# Patient Record
Sex: MALE | URBAN - METROPOLITAN AREA
[De-identification: names, ages, dates, MRNs, and addresses within clinical notes are randomized per-mention and may not be internally consistent; named-entity substitution may affect disease eponyms.]

---

## 2018-07-11 PROBLEM — H90.3 SENSORINEURAL HEARING LOSS, BILATERAL: Status: ACTIVE | Noted: 2018-07-11

## 2018-07-11 PROBLEM — K63.5 POLYP OF COLON: Status: ACTIVE | Noted: 2018-07-11

## 2018-07-11 PROBLEM — I25.9 CHRONIC ISCHEMIC HEART DISEASE, UNSPECIFIED: Status: ACTIVE | Noted: 2018-07-11

## 2019-07-18 PROBLEM — G47.30 SLEEP APNEA, UNSPECIFIED: Status: ACTIVE | Noted: 2019-07-18

## 2019-07-18 PROBLEM — E78.5 HYPERLIPIDEMIA, UNSPECIFIED: Status: ACTIVE | Noted: 2019-07-18

## 2019-07-18 PROBLEM — R73.01 IMPAIRED FASTING GLUCOSE: Status: ACTIVE | Noted: 2019-07-18

## 2019-07-18 PROBLEM — M05.79 RHEUMATOID ARTHRITIS WITH RHEUMATOID FACTOR OF MULTIPLE SITES WITHOUT ORGAN OR SYSTEMS INVOLVEMENT: Status: ACTIVE | Noted: 2019-07-18

## 2019-07-18 PROBLEM — Z79.899 OTHER LONG TERM (CURRENT) DRUG THERAPY: Status: ACTIVE | Noted: 2019-07-18

## 2019-07-18 PROBLEM — K57.30 DIVERTICULOSIS OF LARGE INTESTINE WITHOUT PERFORATION OR ABSCESS WITHOUT BLEEDING: Status: ACTIVE | Noted: 2019-07-18

## 2019-07-18 PROBLEM — M10.9 GOUT, UNSPECIFIED: Status: ACTIVE | Noted: 2019-07-18

## 2019-07-18 PROBLEM — I10 ESSENTIAL (PRIMARY) HYPERTENSION: Status: ACTIVE | Noted: 2019-07-18

## 2019-07-18 PROBLEM — K27.9 PEPTIC ULCER, SITE UNSPECIFIED, UNSPECIFIED AS ACUTE OR CHRONIC, WITHOUT HEMORRHAGE OR PERFORATION: Status: ACTIVE | Noted: 2019-07-18

## 2019-07-18 PROBLEM — M25.519 PAIN IN UNSPECIFIED SHOULDER: Status: ACTIVE | Noted: 2019-07-18

## 2019-07-18 PROBLEM — T78.40XA ALLERGY, UNSPECIFIED, INITIAL ENCOUNTER: Status: ACTIVE | Noted: 2019-07-18

## 2019-07-18 PROBLEM — E66.3 OVERWEIGHT: Status: ACTIVE | Noted: 2019-07-18

## 2019-07-25 PROBLEM — Z00.00 ENCOUNTER FOR GENERAL ADULT MEDICAL EXAMINATION WITHOUT ABNORMAL FINDINGS: Status: ACTIVE | Noted: 2019-07-25

## 2019-10-15 PROBLEM — N18.30 CHRONIC KIDNEY DISEASE, STAGE 3 UNSPECIFIED: Status: ACTIVE | Noted: 2019-10-15

## 2019-10-15 PROBLEM — Z86.711 PERSONAL HISTORY OF PULMONARY EMBOLISM: Status: ACTIVE | Noted: 2019-10-15

## 2021-06-22 ENCOUNTER — APPOINTMENT (RX ONLY)
Dept: URBAN - METROPOLITAN AREA CLINIC 33 | Facility: CLINIC | Age: 86
Setting detail: DERMATOLOGY
End: 2021-06-22

## 2021-06-22 VITALS
DIASTOLIC BLOOD PRESSURE: 67 MMHG | TEMPERATURE: 98.3 F | WEIGHT: 178 LBS | SYSTOLIC BLOOD PRESSURE: 122 MMHG | HEIGHT: 67 IN | HEART RATE: 71 BPM

## 2021-06-22 DIAGNOSIS — L81.0 POSTINFLAMMATORY HYPERPIGMENTATION: ICD-10-CM

## 2021-06-22 DIAGNOSIS — R21 RASH AND OTHER NONSPECIFIC SKIN ERUPTION: ICD-10-CM

## 2021-06-22 DIAGNOSIS — R03.0 ELEVATED BLOOD-PRESSURE READING, WITHOUT DIAGNOSIS OF HYPERTENSION: ICD-10-CM

## 2021-06-22 PROBLEM — Z11.59 ENCOUNTER FOR SCREENING FOR OTHER VIRAL DISEASES: Status: ACTIVE | Noted: 2021-06-22

## 2021-06-22 PROCEDURE — ? PLAN FOR BMI MANAGEMENT

## 2021-06-22 PROCEDURE — ? REFERRAL CORRESPONDENCE

## 2021-06-22 PROCEDURE — ? ADDITIONAL NOTES

## 2021-06-22 PROCEDURE — ? COUNSELING

## 2021-06-22 PROCEDURE — 99203 OFFICE O/P NEW LOW 30 MIN: CPT

## 2021-06-22 ASSESSMENT — LOCATION SIMPLE DESCRIPTION DERM
LOCATION SIMPLE: LEFT FOREARM
LOCATION SIMPLE: LEFT BUTTOCK
LOCATION SIMPLE: RIGHT FOREARM

## 2021-06-22 ASSESSMENT — LOCATION ZONE DERM
LOCATION ZONE: TRUNK
LOCATION ZONE: ARM

## 2021-06-22 ASSESSMENT — LOCATION DETAILED DESCRIPTION DERM
LOCATION DETAILED: LEFT BUTTOCK
LOCATION DETAILED: RIGHT DISTAL DORSAL FOREARM
LOCATION DETAILED: LEFT PROXIMAL DORSAL FOREARM

## 2021-06-22 NOTE — HPI: RASH
What Type Of Note Output Would You Prefer (Optional)?: Bullet Format
How Severe Is Your Rash?: moderate
Is This A New Presentation, Or A Follow-Up?: Rash
Additional History: Patient notes he started a new medication when arms started to erupt. He has not taken med for 3-4 days and it is starting to clear. He did put Mupirocin on arms.

## 2021-06-22 NOTE — HPI: SKIN LESION
Is This A New Presentation, Or A Follow-Up?: Skin Lesion
What Type Of Note Output Would You Prefer (Optional)?: Bullet Format
How Severe Is Your Skin Lesion?: moderate
Additional History: Patient states lesion comes and goes with no pattern. He treats with Mupirocin for about a week and lesion will settle down.

## 2021-06-22 NOTE — PROCEDURE: COUNSELING
Detail Level: Detailed
Quality 317: Preventative Care And Screening: Screening For High Blood Pressure And Follow-Up Documented: Pre-hypertensive or hypertensive blood pressure reading documented, and the indicated follow-up is documented
Detail Level: Zone
Patient Specific Counseling (Will Not Stick From Patient To Patient): Since he was here, he states, he wanted to ask about eruption on the arms.  These are small discrete papules on the dorsal surfaces of forearms- few scattered from wrist to just above elbow - he notes that he gets these about every early spring or summer.  Doesn't present necessarily as polymorphous light eruption however possibility.  This is very mild -\"I'm just curious\" he states.  \\n\\nAdvised that it may be related initially to sunlight - and/or medications or something causing irritation to the skin.\\n\\nRecommend:\\n1.  Moisturize well daily with CeraVe or Cetaphil Cream in a jar\\n2.  Avoid scratching and picking at the skin\\n3.  Sun ;protect SPF 30-50 reapply every 2 hours when out\\n4.  Consider long sleeves avoid scratching and heat\\n\\nMeasures given on handout to avoid scratching.  He likes his very hot showers- advised to limit that to tepid or cooler.  Return if this doesn't improve.
Patient Specific Counseling (Will Not Stick From Patient To Patient): He has two patches of PIH on the buttock region otherwise he is clear.  It is challenging to second guess this at this point and advised that we need to take some measures and when this recurs he is to contact the clinic and see if he can be seen just for this at this time.  Today it is resolved/resolving.\\n\\nIt sounds he may have challenges with recurrent furuncle.  However the other patch resembles more of what would be seen post-herpetic eruption.  He is unable to share any specifics regarding what they look like noting the one that recurs mostly some white \"pus\" came out of it.  \\n\\nSo at this time:\\n1 . Wash daily with antibacterial soap- leave on skin some prior to rinsing - such as dial.\\n2 . Dry well - wear clean loose fitting clothes\\n3.  Contact clinic if active.  \\n\\n
Sunscreen Recommendations: SPF 30 for the face reapplication every 2 hours when out - variety of sunscreens available one willing to use is optimal - suggestions:\\n1.  Cetaphil Pro Oil Controlling Moisturizer SPF 30\\n2.  Powdered sunscreens such as [employer]ores[employer]ience (many out there) \\n3.  Neutrogena product line
Bleaching Agents Recommendations: Over the counter recommendations include products containing\\nVitamin C,  Kojic acid, Licorice root, Arbutin, Niacinamide- B3\\nThere are a number of stores and product lines that contain what are considered more \"natural\" lighteners. \\nOver the counter strength Hydroquinone also a consideration .

## 2023-10-09 ENCOUNTER — APPOINTMENT (RX ONLY)
Dept: URBAN - METROPOLITAN AREA CLINIC 33 | Facility: CLINIC | Age: 88
Setting detail: DERMATOLOGY
End: 2023-10-09

## 2023-10-09 VITALS
SYSTOLIC BLOOD PRESSURE: 170 MMHG | HEART RATE: 66 BPM | HEIGHT: 67 IN | DIASTOLIC BLOOD PRESSURE: 88 MMHG | WEIGHT: 155 LBS

## 2023-10-09 PROBLEM — D10.0 BENIGN NEOPLASM OF LIP: Status: ACTIVE | Noted: 2023-10-09

## 2023-10-09 PROCEDURE — ? REFERRAL CORRESPONDENCE

## 2023-10-09 PROCEDURE — 99213 OFFICE O/P EST LOW 20 MIN: CPT

## 2023-10-09 PROCEDURE — ? OBSERVATION AND MEASURE

## 2023-10-09 NOTE — HPI: SKIN LESION
Is This A New Presentation, Or A Follow-Up?: Skin Lesion Referral
What Type Of Note Output Would You Prefer (Optional)?: Bullet Format
How Severe Is Your Skin Lesion?: mild
Has Your Skin Lesion Been Treated?: not been treated
Which Family Member (Optional)?: Father
Who Is Your Referring Provider?: Dr. Schmidt

## 2023-10-09 NOTE — PROCEDURE: OBSERVATION
Detail Level: Simple
Size Of Lesion: 0.3cm
Morphology Per Location (Optional): movable dermal papule
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  Do not find anything worrisome he notes it comes up and down - and today is nearly gone.  Reviewed do not find cancer features today, and reviewed this with him-.  Encouraged him to avoid manipulating, licking or biting at the lesion.  Also suggest that he have dental take a look at it- from their perspective.\\n\\nHe does have smoking history when in [employer] followed with using chew.  \\n\\Ronen changes or concerns contact the clinic.